# Patient Record
Sex: FEMALE | Race: WHITE | NOT HISPANIC OR LATINO | Employment: FULL TIME | URBAN - METROPOLITAN AREA
[De-identification: names, ages, dates, MRNs, and addresses within clinical notes are randomized per-mention and may not be internally consistent; named-entity substitution may affect disease eponyms.]

---

## 2021-08-16 ENCOUNTER — CLINICAL SUPPORT (OUTPATIENT)
Dept: URGENT CARE | Facility: CLINIC | Age: 36
End: 2021-08-16
Payer: COMMERCIAL

## 2021-08-16 DIAGNOSIS — Z11.52 ENCOUNTER FOR SCREENING LABORATORY TESTING FOR COVID-19 VIRUS: Primary | ICD-10-CM

## 2021-08-16 LAB
CTP QC/QA: YES
SARS-COV-2 RDRP RESP QL NAA+PROBE: NEGATIVE

## 2021-08-16 PROCEDURE — U0002 COVID-19 LAB TEST NON-CDC: HCPCS | Mod: QW,S$GLB,, | Performed by: FAMILY MEDICINE

## 2021-08-16 PROCEDURE — 99211 OFF/OP EST MAY X REQ PHY/QHP: CPT | Mod: S$GLB,,, | Performed by: FAMILY MEDICINE

## 2021-08-16 PROCEDURE — U0002: ICD-10-PCS | Mod: QW,S$GLB,, | Performed by: FAMILY MEDICINE

## 2021-08-16 PROCEDURE — 99211 PR OFFICE/OUTPT VISIT, EST, LEVL I: ICD-10-PCS | Mod: S$GLB,,, | Performed by: FAMILY MEDICINE

## 2022-01-14 ENCOUNTER — OFFICE VISIT (OUTPATIENT)
Dept: URGENT CARE | Facility: CLINIC | Age: 37
End: 2022-01-14
Payer: COMMERCIAL

## 2022-01-14 VITALS
HEART RATE: 75 BPM | DIASTOLIC BLOOD PRESSURE: 84 MMHG | SYSTOLIC BLOOD PRESSURE: 129 MMHG | HEIGHT: 67 IN | WEIGHT: 175 LBS | RESPIRATION RATE: 16 BRPM | OXYGEN SATURATION: 96 % | TEMPERATURE: 98 F | BODY MASS INDEX: 27.47 KG/M2

## 2022-01-14 DIAGNOSIS — R05.9 COUGH: Primary | ICD-10-CM

## 2022-01-14 LAB
CTP QC/QA: YES
SARS-COV-2 RDRP RESP QL NAA+PROBE: NEGATIVE

## 2022-01-14 PROCEDURE — 99203 OFFICE O/P NEW LOW 30 MIN: CPT | Mod: S$GLB,,, | Performed by: PHYSICIAN ASSISTANT

## 2022-01-14 PROCEDURE — 1160F RVW MEDS BY RX/DR IN RCRD: CPT | Mod: CPTII,S$GLB,, | Performed by: PHYSICIAN ASSISTANT

## 2022-01-14 PROCEDURE — 1159F MED LIST DOCD IN RCRD: CPT | Mod: CPTII,S$GLB,, | Performed by: PHYSICIAN ASSISTANT

## 2022-01-14 PROCEDURE — U0002 COVID-19 LAB TEST NON-CDC: HCPCS | Mod: QW,S$GLB,, | Performed by: PHYSICIAN ASSISTANT

## 2022-01-14 PROCEDURE — 3079F DIAST BP 80-89 MM HG: CPT | Mod: CPTII,S$GLB,, | Performed by: PHYSICIAN ASSISTANT

## 2022-01-14 PROCEDURE — 3079F PR MOST RECENT DIASTOLIC BLOOD PRESSURE 80-89 MM HG: ICD-10-PCS | Mod: CPTII,S$GLB,, | Performed by: PHYSICIAN ASSISTANT

## 2022-01-14 PROCEDURE — 3008F PR BODY MASS INDEX (BMI) DOCUMENTED: ICD-10-PCS | Mod: CPTII,S$GLB,, | Performed by: PHYSICIAN ASSISTANT

## 2022-01-14 PROCEDURE — 3074F SYST BP LT 130 MM HG: CPT | Mod: CPTII,S$GLB,, | Performed by: PHYSICIAN ASSISTANT

## 2022-01-14 PROCEDURE — 99203 PR OFFICE/OUTPT VISIT, NEW, LEVL III, 30-44 MIN: ICD-10-PCS | Mod: S$GLB,,, | Performed by: PHYSICIAN ASSISTANT

## 2022-01-14 PROCEDURE — 3008F BODY MASS INDEX DOCD: CPT | Mod: CPTII,S$GLB,, | Performed by: PHYSICIAN ASSISTANT

## 2022-01-14 PROCEDURE — 3074F PR MOST RECENT SYSTOLIC BLOOD PRESSURE < 130 MM HG: ICD-10-PCS | Mod: CPTII,S$GLB,, | Performed by: PHYSICIAN ASSISTANT

## 2022-01-14 PROCEDURE — 1159F PR MEDICATION LIST DOCUMENTED IN MEDICAL RECORD: ICD-10-PCS | Mod: CPTII,S$GLB,, | Performed by: PHYSICIAN ASSISTANT

## 2022-01-14 PROCEDURE — 1160F PR REVIEW ALL MEDS BY PRESCRIBER/CLIN PHARMACIST DOCUMENTED: ICD-10-PCS | Mod: CPTII,S$GLB,, | Performed by: PHYSICIAN ASSISTANT

## 2022-01-14 PROCEDURE — U0002: ICD-10-PCS | Mod: QW,S$GLB,, | Performed by: PHYSICIAN ASSISTANT

## 2022-01-14 NOTE — PATIENT INSTRUCTIONS
You must understand that you've received an Urgent Care treatment only and that you may be released before all your medical problems are known or treated. You, the patient, will arrange for follow up care as instructed.  Follow up with your PCP or specialty clinic as directed if not improved or as needed. You can call [phone] to schedule an appointment with the appropriate provider.  If your condition worsens we recommend that you receive another evaluation at the Emergency Department for any concerns or worsening of condition.  Patient aware and verbalized understanding.    Reviewed COVID-19 results with patient.  Counseled patient and answered questions in regards to COVID-19 testing.  Advised patient to go home, treat symptoms with over-the-counter (OTC) medications and avoid contact with others at this time.  Increase fluids and rest is important.  Humidifier use at home.  OTC Claritin or Zyrtec or Allegra daily as needed for nasal congestion/postnasal drip/allergies.  OTC Flonase Nasal Spray daily as needed for nasal congestion/postnasal drip/allergies.  Advised patient to take OTC VITAMIN C and VITAMIN D and ZINC unless contraindicated as discussed.  Alternate OTC Tylenol and Ibuprofen unless contraindicated every 4-6 hours as needed for pain, headache, fever, etc.  Info given for virtual visit, covid 19 information line, state info line.   Advised patient to follow-up with PCP and/or Specialist for further evaluation as needed.   Strict ER precautions given to patient.  Follow local/state guidelines per covid emergency.   Patient aware, verbalized understanding and agreed with plan of care.    CDC RECOMMENDATIONS  --IF test results are NEGATIVE and NO known high risk exposure to covid-19 virus, you can be excluded from work/school until:  o MINIMUM OF 24 hours fever-free without the use of fever-reducing medications AND  o Improvement in symptoms (e.g. cough, shortness of breath, fatigue, GI symptoms,  etc)     --IF YOU ARE BEING TESTED BECAUSE OF A HIGH RISK EXPOSURE, which the CDC defines as direct contact 6 feet or less for >15 minutes with a known positive person, you should follow CDC guidelines as well as your employer/school protocols for safely returning to work/school.   *Please be aware that there are False Negative possibilities with testing, so you should return to work/school based upon CDC guidelines, not simply a negative result, unless your employer/school has a different RTW protocol/guidance for you.     --IF test results are POSITIVE , you should be excluded from work/school until:  o At least 24 hours FEVER-FREE without the use of fever-reducing medications AND  o Improvement in symptoms (e.g., cough, shortness of breathing, fatigue, GI symptoms, etc) AND  o At least 5 days have passed since symptoms first appeared.    IF NOT IMPROVING, FOLLOW UP WITH VIRTUAL ONLINE VISIT WITH A PROVIDER 24/7 - FOR MORE INFORMATION OR TO DOWNLOAD THE BRUCE, VISIT [facility] ANYWHERE [facility] AT [email or web address]/ANYWHERE  FOR 24/7 NURSE ADVICE, CALL [phone]  FOR COVID 19 RELATED QUESTIONS, CALL the [facility] covid hotline: [phone]  [place] FOR UP TO DATE INFORMATION: Text or dial 211, test keyword LACOVID -775 OR DIAL 363    HELPFUL EXTERNAL RESOURCES:  OFFICE OF PUBLIC HEALTH: [place] - http://ldh.la.gov/ and 1-767.242.5473  CENTER FOR DISEASE CONTROL - https://www.cdc.gov/   WORLD HEALTH ORGANIZATION (WHO) - https://www.who.int/   CDC WHEN TO QUARANTINE - https://www.cdc.gov/coronavirus/2019-ncov/if-you-are-sick/quarantine.html     INFO ABOUT [facility] COVID-19 RAPID TESTING:  This test utilizes isothermal nucleic acid amplification technology to detect the SARS-CoV-2 RdRp nucleic acid segment.   The analytical sensitivity (limit of detection) is 125 genome equivalents/mL.   A POSITIVE result implies infection with the SARS-CoV-2 virus; the patient is presumed to be contagious.     A NEGATIVE result means  that SARS-CoV-2 nucleic acids are not present above the limit of detection.   A NEGATIVE result should be treated as presumptive. It does not rule out the possibility of COVID-19 and should not be the sole basis for treatment decisions.   This test is only for use under the Food and Drug Administration s Emergency Use Authorization (EUA).   Commercial kits are provided by [employer]. Performance characteristics of the EUA have been independently verified by [facility] Department of Pathology and Laboratory Medicine.   _________________________________________________________________   The authorized Fact Sheet for Healthcare Providers and the authorized Fact Sheet for Patients of the ID NOW COVID-19 are available on the FDA website:   https://www.fda.gov/media/689376/download  https://www.fda.gov/media/824966/download

## 2022-01-14 NOTE — PROGRESS NOTES
"Subjective:       Patient ID: Ashleigh Underwood is a 36 y.o. female.    Vitals:  height is 5' 7" (1.702 m) and weight is 79.4 kg (175 lb). Her temperature is 98.2 °F (36.8 °C). Her blood pressure is 129/84 and her pulse is 75. Her respiration is 16 and oxygen saturation is 96%.     Chief Complaint: Cough    Patient is not vaccinated against COVID-19.    Cough  This is a new problem. The current episode started yesterday. The problem has been waxing and waning. The problem occurs every few hours. The cough is non-productive. Associated symptoms include nasal congestion, postnasal drip, rhinorrhea and a sore throat. Pertinent negatives include no chest pain, chills, ear congestion, ear pain, eye redness, fever, headaches, heartburn, hemoptysis, myalgias, rash, shortness of breath, sweats, weight loss or wheezing. Nothing aggravates the symptoms. She has tried nothing for the symptoms.       Constitution: Positive for fatigue. Negative for chills, sweating and fever.   HENT: Positive for congestion, postnasal drip, sinus pressure and sore throat. Negative for ear pain, drooling, nosebleeds, foreign body in nose, sinus pain, trouble swallowing and voice change.    Neck: Negative for neck pain, neck stiffness, painful lymph nodes and neck swelling.   Cardiovascular: Negative for chest pain, leg swelling, palpitations, sob on exertion and passing out.   Eyes: Negative for eye pain, eye redness, photophobia, double vision, blurred vision and eyelid swelling.   Respiratory: Positive for cough. Negative for chest tightness, sputum production, bloody sputum, shortness of breath, stridor and wheezing.    Gastrointestinal: Negative for abdominal pain, abdominal bloating, nausea, vomiting, constipation, diarrhea and heartburn.   Musculoskeletal: Negative for joint pain, joint swelling, abnormal ROM of joint, back pain, muscle cramps and muscle ache.   Skin: Negative for rash and hives.   Allergic/Immunologic: Negative for seasonal " allergies, food allergies, hives, itching and sneezing.   Neurological: Negative for dizziness, light-headedness, passing out, loss of balance, headaches, altered mental status, loss of consciousness and seizures.   Hematologic/Lymphatic: Negative for swollen lymph nodes.   Psychiatric/Behavioral: Negative for altered mental status and nervous/anxious. The patient is not nervous/anxious.        Objective:      Physical Exam   Constitutional: She is oriented to person, place, and time. She appears well-developed and well-nourished. She is cooperative.  Non-toxic appearance. She does not have a sickly appearance. She does not appear ill. No distress.   HENT:   Head: Normocephalic and atraumatic.   Ears:   Right Ear: Hearing, tympanic membrane, external ear and ear canal normal.   Left Ear: Hearing, tympanic membrane, external ear and ear canal normal.   Nose: Mucosal edema and rhinorrhea present. No nasal deformity. No epistaxis. Right sinus exhibits no maxillary sinus tenderness and no frontal sinus tenderness. Left sinus exhibits no maxillary sinus tenderness and no frontal sinus tenderness.   Mouth/Throat: Uvula is midline and mucous membranes are normal. No trismus in the jaw. Normal dentition. No uvula swelling. Posterior oropharyngeal erythema and cobblestoning present. No oropharyngeal exudate, posterior oropharyngeal edema or tonsillar abscesses. No tonsillar exudate.   Eyes: Conjunctivae and lids are normal. No scleral icterus.   Neck: Trachea normal and phonation normal. Neck supple. No edema present. No erythema present. No neck rigidity present.   Cardiovascular: Normal rate, regular rhythm, normal heart sounds, intact distal pulses and normal pulses.   Pulmonary/Chest: Effort normal and breath sounds normal. No accessory muscle usage or stridor. No respiratory distress. She has no decreased breath sounds. She has no wheezes. She has no rhonchi. She has no rales.   Abdominal: Normal appearance.    Musculoskeletal: Normal range of motion.         General: No deformity or edema. Normal range of motion.   Lymphadenopathy:     She has no cervical adenopathy.   Neurological: She is alert and oriented to person, place, and time. She exhibits normal muscle tone. Coordination normal.   Skin: Skin is warm, dry, intact, not diaphoretic, not pale and no rash. Capillary refill takes less than 2 seconds.   Psychiatric: She has a normal mood and affect. Her speech is normal and behavior is normal. Judgment and thought content normal. Cognition and memory  Nursing note and vitals reviewed.        Results for orders placed or performed in visit on 01/14/22   POCT COVID-19 Rapid Screening   Result Value Ref Range    POC Rapid COVID Negative Negative     Acceptable Yes        Assessment:       1. Cough          Plan:     Discussed COVID-19 results with patient. Advised close follow-up with PCP and/or Specialist for further evaluation as needed. ER precautions given to patient as well. Patient aware, verbalized understanding and agreed with plan of care.    Cough  -     POCT COVID-19 Rapid Screening      Patient Instructions   You must understand that you've received an Urgent Care treatment only and that you may be released before all your medical problems are known or treated. You, the patient, will arrange for follow up care as instructed.  Follow up with your PCP or specialty clinic as directed if not improved or as needed. You can call [phone] to schedule an appointment with the appropriate provider.  If your condition worsens we recommend that you receive another evaluation at the Emergency Department for any concerns or worsening of condition.  Patient aware and verbalized understanding.    Reviewed COVID-19 results with patient.  Counseled patient and answered questions in regards to COVID-19 testing.  Advised patient to go home, treat symptoms with over-the-counter (OTC) medications and avoid  contact with others at this time.  Increase fluids and rest is important.  Humidifier use at home.  OTC Claritin or Zyrtec or Allegra daily as needed for nasal congestion/postnasal drip/allergies.  OTC Flonase Nasal Spray daily as needed for nasal congestion/postnasal drip/allergies.  Advised patient to take OTC VITAMIN C and VITAMIN D and ZINC unless contraindicated as discussed.  Alternate OTC Tylenol and Ibuprofen unless contraindicated every 4-6 hours as needed for pain, headache, fever, etc.  Info given for virtual visit, covid 19 information line, state info line.   Advised patient to follow-up with PCP and/or Specialist for further evaluation as needed.   Strict ER precautions given to patient.  Follow local/state guidelines per covid emergency.   Patient aware, verbalized understanding and agreed with plan of care.    CDC RECOMMENDATIONS  --IF test results are NEGATIVE and NO known high risk exposure to covid-19 virus, you can be excluded from work/school until:  o MINIMUM OF 24 hours fever-free without the use of fever-reducing medications AND  o Improvement in symptoms (e.g. cough, shortness of breath, fatigue, GI symptoms, etc)     --IF YOU ARE BEING TESTED BECAUSE OF A HIGH RISK EXPOSURE, which the CDC defines as direct contact 6 feet or less for >15 minutes with a known positive person, you should follow CDC guidelines as well as your employer/school protocols for safely returning to work/school.   *Please be aware that there are False Negative possibilities with testing, so you should return to work/school based upon CDC guidelines, not simply a negative result, unless your employer/school has a different RTW protocol/guidance for you.     --IF test results are POSITIVE , you should be excluded from work/school until:  o At least 24 hours FEVER-FREE without the use of fever-reducing medications AND  o Improvement in symptoms (e.g., cough, shortness of breathing, fatigue, GI symptoms, etc) AND  o At  least 5 days have passed since symptoms first appeared.    IF NOT IMPROVING, FOLLOW UP WITH VIRTUAL ONLINE VISIT WITH A PROVIDER 24/7 - FOR MORE INFORMATION OR TO DOWNLOAD THE BRUCE, VISIT [facility] ANYWHERE CARE AT [email or web address]/ANYWHERE  FOR 24/7 NURSE ADVICE, CALL [phone]  FOR COVID 19 RELATED QUESTIONS, CALL the [facility] covid hotline: [phone]  [place] FOR UP TO DATE INFORMATION: Text or dial 211, test keyword LACOVID -211 OR DIAL 211    HELPFUL EXTERNAL RESOURCES:  OFFICE OF PUBLIC HEALTH: [place] - http://ldh.[place].gov/ and 1-746.114.8952  CENTER FOR DISEASE CONTROL - https://www.cdc.gov/   WORLD HEALTH ORGANIZATION (WHO) - https://www.who.int/   CDC WHEN TO QUARANTINE - https://www.cdc.gov/coronavirus/2019-ncov/if-you-are-sick/quarantine.html     INFO ABOUT [facility] COVID-19 RAPID TESTING:  This test utilizes isothermal nucleic acid amplification technology to detect the SARS-CoV-2 RdRp nucleic acid segment.   The analytical sensitivity (limit of detection) is 125 genome equivalents/mL.   A POSITIVE result implies infection with the SARS-CoV-2 virus; the patient is presumed to be contagious.     A NEGATIVE result means that SARS-CoV-2 nucleic acids are not present above the limit of detection.   A NEGATIVE result should be treated as presumptive. It does not rule out the possibility of COVID-19 and should not be the sole basis for treatment decisions.   This test is only for use under the Food and Drug Administration s Emergency Use Authorization (EUA).   Commercial kits are provided by [employer]. Performance characteristics of the EUA have been independently verified by [facility] Department of Pathology and Laboratory Medicine.   _________________________________________________________________   The authorized Fact Sheet for Healthcare Providers and the authorized Fact Sheet for Patients of the ID NOW COVID-19 are available on the FDA website:    https://www.fda.gov/media/781547/download  https://www.fda.gov/media/044559/download

## 2022-01-14 NOTE — LETTER
[address] [address], [address] ? [place] [address] ? Phone [phone] ? Fax [phone]           Return to Work/School  Patient: Ashleigh Underwood  YOB: 1985   Date: 01/14/2022      To Whom It May Concern:  Ashlegih Underwood was in contact with/seen in my office on 01/14/2022.     Return to Work/School Protocol & Process for Employee/Student with symptoms concerning for COVID-19   The below are simply guidelines of our health system for our employees/students --- Please note that your Employer/School may have different criteria for timeline to return to work/school.    --Please exclude employee/student for days missed from work/school until:   MINIMUM OF 24 hours fever-free without the use of fever-reducing medications AND   Improvement in symptoms (e.g. cough, shortness of breath, fatigue, GI symptoms, etc.)  *Please be aware that there are False Negative possibilities with testing, and you should return to work/school based upon CDC guidelines, not simply a negative result, unless your employer/school has a different return to work/school protocol/guidance for you. *     If you have any questions or concerns, or if I can be of further assistance, please do not hesitate to contact me.     Sincerely,      Jessie Lara PA-C